# Patient Record
Sex: MALE | Race: BLACK OR AFRICAN AMERICAN | Employment: UNEMPLOYED | URBAN - METROPOLITAN AREA
[De-identification: names, ages, dates, MRNs, and addresses within clinical notes are randomized per-mention and may not be internally consistent; named-entity substitution may affect disease eponyms.]

---

## 2017-05-22 ENCOUNTER — APPOINTMENT (OUTPATIENT)
Dept: GENERAL RADIOLOGY | Age: 67
DRG: 881 | End: 2017-05-22
Attending: EMERGENCY MEDICINE
Payer: MEDICARE

## 2017-05-22 ENCOUNTER — HOSPITAL ENCOUNTER (INPATIENT)
Age: 67
LOS: 3 days | Discharge: HOME | DRG: 881 | End: 2017-05-25
Attending: EMERGENCY MEDICINE | Admitting: PSYCHIATRY & NEUROLOGY
Payer: MEDICARE

## 2017-05-22 DIAGNOSIS — G89.29 CHRONIC ABDOMINAL PAIN: ICD-10-CM

## 2017-05-22 DIAGNOSIS — F10.29 ALCOHOL DEPENDENCE WITH UNSPECIFIED ALCOHOL-INDUCED DISORDER (HCC): Primary | ICD-10-CM

## 2017-05-22 DIAGNOSIS — R10.9 CHRONIC ABDOMINAL PAIN: ICD-10-CM

## 2017-05-22 DIAGNOSIS — F32.A DEPRESSION, UNSPECIFIED DEPRESSION TYPE: ICD-10-CM

## 2017-05-22 LAB
ALBUMIN SERPL BCP-MCNC: 3.5 G/DL (ref 3.4–5)
ALBUMIN/GLOB SERPL: 0.8 {RATIO} (ref 0.8–1.7)
ALP SERPL-CCNC: 120 U/L (ref 45–117)
ALT SERPL-CCNC: 58 U/L (ref 16–61)
AMPHET UR QL SCN: NEGATIVE
ANION GAP BLD CALC-SCNC: 8 MMOL/L (ref 3–18)
APPEARANCE UR: CLEAR
AST SERPL W P-5'-P-CCNC: 58 U/L (ref 15–37)
BARBITURATES UR QL SCN: NEGATIVE
BASOPHILS # BLD AUTO: 0 K/UL (ref 0–0.06)
BASOPHILS # BLD: 0 % (ref 0–2)
BENZODIAZ UR QL: NEGATIVE
BILIRUB SERPL-MCNC: 0.5 MG/DL (ref 0.2–1)
BILIRUB UR QL: NEGATIVE
BUN SERPL-MCNC: 8 MG/DL (ref 7–18)
BUN/CREAT SERPL: 9 (ref 12–20)
CALCIUM SERPL-MCNC: 8.7 MG/DL (ref 8.5–10.1)
CANNABINOIDS UR QL SCN: NEGATIVE
CHLORIDE SERPL-SCNC: 105 MMOL/L (ref 100–108)
CO2 SERPL-SCNC: 28 MMOL/L (ref 21–32)
COCAINE UR QL SCN: NEGATIVE
COLOR UR: YELLOW
CREAT SERPL-MCNC: 0.88 MG/DL (ref 0.6–1.3)
DIFFERENTIAL METHOD BLD: ABNORMAL
EOSINOPHIL # BLD: 0 K/UL (ref 0–0.4)
EOSINOPHIL NFR BLD: 0 % (ref 0–5)
ERYTHROCYTE [DISTWIDTH] IN BLOOD BY AUTOMATED COUNT: 16 % (ref 11.6–14.5)
ETHANOL SERPL-MCNC: 6 MG/DL (ref 0–3)
GLOBULIN SER CALC-MCNC: 4.4 G/DL (ref 2–4)
GLUCOSE SERPL-MCNC: 102 MG/DL (ref 74–99)
GLUCOSE UR STRIP.AUTO-MCNC: NEGATIVE MG/DL
HCT VFR BLD AUTO: 36.2 % (ref 36–48)
HDSCOM,HDSCOM: NORMAL
HGB BLD-MCNC: 12 G/DL (ref 13–16)
HGB UR QL STRIP: NEGATIVE
KETONES UR QL STRIP.AUTO: NEGATIVE MG/DL
LEUKOCYTE ESTERASE UR QL STRIP.AUTO: NEGATIVE
LIPASE SERPL-CCNC: 306 U/L (ref 73–393)
LYMPHOCYTES # BLD AUTO: 25 % (ref 21–52)
LYMPHOCYTES # BLD: 1.2 K/UL (ref 0.9–3.6)
MAGNESIUM SERPL-MCNC: 1.6 MG/DL (ref 1.6–2.6)
MCH RBC QN AUTO: 26.3 PG (ref 24–34)
MCHC RBC AUTO-ENTMCNC: 33.1 G/DL (ref 31–37)
MCV RBC AUTO: 79.4 FL (ref 74–97)
METHADONE UR QL: NEGATIVE
MONOCYTES # BLD: 0.6 K/UL (ref 0.05–1.2)
MONOCYTES NFR BLD AUTO: 12 % (ref 3–10)
NEUTS SEG # BLD: 3.1 K/UL (ref 1.8–8)
NEUTS SEG NFR BLD AUTO: 63 % (ref 40–73)
NITRITE UR QL STRIP.AUTO: NEGATIVE
OPIATES UR QL: NEGATIVE
PCP UR QL: NEGATIVE
PH UR STRIP: 5.5 [PH] (ref 5–8)
PLATELET # BLD AUTO: 197 K/UL (ref 135–420)
PMV BLD AUTO: 9.6 FL (ref 9.2–11.8)
POTASSIUM SERPL-SCNC: 3.3 MMOL/L (ref 3.5–5.5)
PROT SERPL-MCNC: 7.9 G/DL (ref 6.4–8.2)
PROT UR STRIP-MCNC: NEGATIVE MG/DL
RBC # BLD AUTO: 4.56 M/UL (ref 4.7–5.5)
SODIUM SERPL-SCNC: 141 MMOL/L (ref 136–145)
SP GR UR REFRACTOMETRY: 1.02 (ref 1–1.03)
UROBILINOGEN UR QL STRIP.AUTO: 1 EU/DL (ref 0.2–1)
WBC # BLD AUTO: 4.9 K/UL (ref 4.6–13.2)

## 2017-05-22 PROCEDURE — 80307 DRUG TEST PRSMV CHEM ANLYZR: CPT | Performed by: EMERGENCY MEDICINE

## 2017-05-22 PROCEDURE — 74020 XR ABD (AP AND ERECT OR DECUB): CPT

## 2017-05-22 PROCEDURE — 80053 COMPREHEN METABOLIC PANEL: CPT | Performed by: EMERGENCY MEDICINE

## 2017-05-22 PROCEDURE — 83690 ASSAY OF LIPASE: CPT | Performed by: EMERGENCY MEDICINE

## 2017-05-22 PROCEDURE — 74011250637 HC RX REV CODE- 250/637: Performed by: PSYCHIATRY & NEUROLOGY

## 2017-05-22 PROCEDURE — 85025 COMPLETE CBC W/AUTO DIFF WBC: CPT | Performed by: EMERGENCY MEDICINE

## 2017-05-22 PROCEDURE — 65220000005 HC RM SEMIPRIVATE PSYCH 3 OR 4 BED

## 2017-05-22 PROCEDURE — 99284 EMERGENCY DEPT VISIT MOD MDM: CPT

## 2017-05-22 PROCEDURE — 74011250637 HC RX REV CODE- 250/637: Performed by: EMERGENCY MEDICINE

## 2017-05-22 PROCEDURE — 81003 URINALYSIS AUTO W/O SCOPE: CPT | Performed by: EMERGENCY MEDICINE

## 2017-05-22 PROCEDURE — 83735 ASSAY OF MAGNESIUM: CPT | Performed by: EMERGENCY MEDICINE

## 2017-05-22 RX ORDER — SODIUM CHLORIDE 0.9 % (FLUSH) 0.9 %
5-10 SYRINGE (ML) INJECTION AS NEEDED
Status: DISCONTINUED | OUTPATIENT
Start: 2017-05-22 | End: 2017-05-22

## 2017-05-22 RX ORDER — LORAZEPAM 2 MG/ML
1 INJECTION INTRAMUSCULAR
Status: DISCONTINUED | OUTPATIENT
Start: 2017-05-22 | End: 2017-05-22

## 2017-05-22 RX ORDER — IBUPROFEN 600 MG/1
600 TABLET ORAL
Status: DISCONTINUED | OUTPATIENT
Start: 2017-05-22 | End: 2017-05-25 | Stop reason: HOSPADM

## 2017-05-22 RX ORDER — LORAZEPAM 1 MG/1
2 TABLET ORAL
Status: DISCONTINUED | OUTPATIENT
Start: 2017-05-22 | End: 2017-05-22

## 2017-05-22 RX ORDER — CLONIDINE HYDROCHLORIDE 0.1 MG/1
0.1 TABLET ORAL 2 TIMES DAILY
Status: DISCONTINUED | OUTPATIENT
Start: 2017-05-22 | End: 2017-05-25 | Stop reason: HOSPADM

## 2017-05-22 RX ORDER — THERA TABS 400 MCG
1 TAB ORAL DAILY
Status: DISCONTINUED | OUTPATIENT
Start: 2017-05-23 | End: 2017-05-22

## 2017-05-22 RX ORDER — CLONIDINE HYDROCHLORIDE 0.1 MG/1
0.1 TABLET ORAL EVERY 8 HOURS
Status: DISCONTINUED | OUTPATIENT
Start: 2017-05-22 | End: 2017-05-22

## 2017-05-22 RX ORDER — TRAZODONE HYDROCHLORIDE 50 MG/1
50 TABLET ORAL
Status: DISCONTINUED | OUTPATIENT
Start: 2017-05-22 | End: 2017-05-25 | Stop reason: HOSPADM

## 2017-05-22 RX ORDER — DICYCLOMINE HYDROCHLORIDE 10 MG/ML
20 INJECTION INTRAMUSCULAR ONCE
Status: DISCONTINUED | OUTPATIENT
Start: 2017-05-22 | End: 2017-05-22

## 2017-05-22 RX ORDER — LORAZEPAM 2 MG/ML
1-2 INJECTION INTRAMUSCULAR
Status: DISCONTINUED | OUTPATIENT
Start: 2017-05-22 | End: 2017-05-25 | Stop reason: HOSPADM

## 2017-05-22 RX ORDER — HALOPERIDOL 5 MG/1
5 TABLET ORAL
Status: DISCONTINUED | OUTPATIENT
Start: 2017-05-22 | End: 2017-05-25 | Stop reason: HOSPADM

## 2017-05-22 RX ORDER — FOLIC ACID 1 MG/1
1 TABLET ORAL DAILY
Status: DISCONTINUED | OUTPATIENT
Start: 2017-05-23 | End: 2017-05-25 | Stop reason: HOSPADM

## 2017-05-22 RX ORDER — LORAZEPAM 1 MG/1
1 TABLET ORAL
Status: DISCONTINUED | OUTPATIENT
Start: 2017-05-22 | End: 2017-05-22

## 2017-05-22 RX ORDER — LORAZEPAM 1 MG/1
1-2 TABLET ORAL
Status: DISCONTINUED | OUTPATIENT
Start: 2017-05-22 | End: 2017-05-25 | Stop reason: HOSPADM

## 2017-05-22 RX ORDER — LANOLIN ALCOHOL/MO/W.PET/CERES
100 CREAM (GRAM) TOPICAL DAILY
Status: DISCONTINUED | OUTPATIENT
Start: 2017-05-23 | End: 2017-05-22

## 2017-05-22 RX ORDER — ONDANSETRON 4 MG/1
4 TABLET, FILM COATED ORAL
Status: DISCONTINUED | OUTPATIENT
Start: 2017-05-22 | End: 2017-05-25 | Stop reason: HOSPADM

## 2017-05-22 RX ORDER — CHLORDIAZEPOXIDE HYDROCHLORIDE 25 MG/1
25 CAPSULE, GELATIN COATED ORAL
Status: DISCONTINUED | OUTPATIENT
Start: 2017-05-22 | End: 2017-05-25 | Stop reason: HOSPADM

## 2017-05-22 RX ORDER — FOLIC ACID 1 MG/1
1 TABLET ORAL DAILY
Status: DISCONTINUED | OUTPATIENT
Start: 2017-05-23 | End: 2017-05-22

## 2017-05-22 RX ORDER — ATENOLOL 25 MG/1
12.5 TABLET ORAL DAILY
Status: DISCONTINUED | OUTPATIENT
Start: 2017-05-23 | End: 2017-05-22

## 2017-05-22 RX ORDER — LORAZEPAM 2 MG/ML
2 INJECTION INTRAMUSCULAR
Status: DISCONTINUED | OUTPATIENT
Start: 2017-05-22 | End: 2017-05-22

## 2017-05-22 RX ORDER — LANOLIN ALCOHOL/MO/W.PET/CERES
100 CREAM (GRAM) TOPICAL DAILY
Status: DISCONTINUED | OUTPATIENT
Start: 2017-05-23 | End: 2017-05-25 | Stop reason: HOSPADM

## 2017-05-22 RX ORDER — LORAZEPAM 2 MG/ML
3 INJECTION INTRAMUSCULAR
Status: DISCONTINUED | OUTPATIENT
Start: 2017-05-22 | End: 2017-05-22

## 2017-05-22 RX ORDER — HALOPERIDOL 5 MG/ML
5 INJECTION INTRAMUSCULAR
Status: DISCONTINUED | OUTPATIENT
Start: 2017-05-22 | End: 2017-05-25 | Stop reason: HOSPADM

## 2017-05-22 RX ORDER — SODIUM CHLORIDE 0.9 % (FLUSH) 0.9 %
5-10 SYRINGE (ML) INJECTION EVERY 8 HOURS
Status: DISCONTINUED | OUTPATIENT
Start: 2017-05-22 | End: 2017-05-22

## 2017-05-22 RX ORDER — THERA TABS 400 MCG
1 TAB ORAL DAILY
Status: DISCONTINUED | OUTPATIENT
Start: 2017-05-23 | End: 2017-05-25 | Stop reason: HOSPADM

## 2017-05-22 RX ADMIN — CLONIDINE HYDROCHLORIDE 0.1 MG: 0.1 TABLET ORAL at 18:33

## 2017-05-22 RX ADMIN — TRAZODONE HYDROCHLORIDE 50 MG: 50 TABLET ORAL at 22:55

## 2017-05-22 RX ADMIN — CLONIDINE HYDROCHLORIDE 0.1 MG: 0.1 TABLET ORAL at 22:24

## 2017-05-22 NOTE — IP AVS SNAPSHOT
Vahid Irvin 
 
 
 [facility] [facility] Patient: Curt Schaffer MRN: [id number] :1950 You are allergic to the following No active allergies Recent Documentation Height Weight BMI Smoking Status 1.702 m 77.6 kg 26.78 kg/m2 Never Smoker Emergency Contacts Name Discharge Info Relation Home Work Mobile Sherin Davis DISCHARGE CAREGIVER [3] Spouse [3] [phone] About your hospitalization You were admitted on:  May 22, 2017 You last received care in the:  [facility] ADULT CHEM DEP You were discharged on:  May 25, 2017 Unit phone number:  [phone] Why you were hospitalized Your primary diagnosis was:  Not on File Your diagnoses also included:  Depression Providers Seen During Your Hospitalizations Provider Role Specialty Primary office phone Homa Oneal MD Attending Provider Emergency Medicine [phone] Essence Pugh MD Attending Provider Emergency Medicine [phone] Romain Shell MD Attending Provider Psychiatry [phone] Your Primary Care Physician (PCP) Primary Care Physician Office Phone Office Fax Nic Toussaint [phone] Follow-up Information Follow up With Details Comments Contact Info Sundeep Mcguire MD   [address] [place] [address] [phone] 
[phone] [facility] On 2017 3:00pm appointment with Benita Soto LCSW, MAT for continuation of therapy. [address] [address] 
[phone] [address] [facility]. Schedule an appointment as soon as possible for a visit [facility] will be contacting patient regarding appointment for medication management. [address] [facility] [address] 
[phone] Current Discharge Medication List  
  
START taking these medications Dose & Instructions Dispensing Information Comments Morning Noon Evening Bedtime  
 gabapentin 300 mg capsule Commonly known as:  NEURONTIN Dose:  300 mg Take 1 Cap by mouth three (3) times daily for 30 days. Indications: NEUROPATHIC PAIN Quantity:  90 Cap Refills:  0  
     
  
 2:00pm  
   
   
  
  
 traZODone 50 mg tablet Commonly known as:  Arneta Messenger Dose:  50 mg Take 1 Tab by mouth nightly for 30 days. Indications: insomnia associated with depression Quantity:  30 Tab Refills:  0 STOP taking these medications   
 erythromycin ophthalmic ointment Commonly known as:  ILOTYCIN Where to Get Your Medications Information on where to get these meds will be given to you by the nurse or doctor. ! Ask your nurse or doctor about these medications  
  gabapentin 300 mg capsule  
 traZODone 50 mg tablet Discharge Instructions ***IMPORTANT NUMBERS*** [address] [facility] 
      [phone] [address] [place] [address] 
     [phone] Suicide Prevention [phone] Patient is alert x3 and ambulatory. Patient has copy of discharge papers with follow up appt. Patient has prescriptions to be filled at pharmacy of choice. Patient has all personal belongings and has signed form. Patient denies thoughts of self harm or harm to others at this time. Patient armband taken and shredded. Patient received transportation from sister and will be staying with sister until bed is ready at [facility]. Discharge Orders None [facility] Announcement We are excited to announce that we are making your provider's discharge notes available to you in "[employer]. You will see these notes when they are completed and signed by the physician that discharged you from your recent hospital stay.   If you have any questions or concerns about any information you see in Scutum, please call the Health Information Department where you were seen or reach out to your Primary Care Provider for more information about your plan of care. Introducing [facility]! [facility] introduces Scutum patient portal. Now you can access parts of your medical record, email your doctor's office, and request medication refills online. 1. In your internet browser, go to [email or web address] 2. Click on the First Time User? Click Here link in the Sign In box. You will see the New Member Sign Up page. 3. Enter your Scutum Access Code exactly as it appears below. You will not need to use this code after youve completed the sign-up process. If you do not sign up before the expiration date, you must request a new code. · Scutum Access Code: [email or web address] Expires: 8/21/2017  9:17 AM 
 
4. Enter the last four digits of your Social Security Number (xxxx) and Date of Birth (mm/dd/yyyy) as indicated and click Submit. You will be taken to the next sign-up page. 5. Create a Scutum ID. This will be your Scutum login ID and cannot be changed, so think of one that is secure and easy to remember. 6. Create a Scutum password. You can change your password at any time. 7. Enter your Password Reset Question and Answer. This can be used at a later time if you forget your password. 8. Enter your e-mail address. You will receive e-mail notification when new information is available in [address] 9. Click Sign Up. You can now view and download portions of your medical record. 10. Click the Download Summary menu link to download a portable copy of your medical information. If you have questions, please visit the Frequently Asked Questions section of the Scutum website. Remember, Scutum is NOT to be used for urgent needs. For medical emergencies, dial 911. Now available from your iPhone and Android! General Information Please provide this summary of care documentation to your next provider. Patient Signature:  ____________________________________________________________ Date:  ____________________________________________________________  
  
Lajuanda Yashira Provider Signature:  ____________________________________________________________ Date:  ____________________________________________________________

## 2017-05-22 NOTE — ED NOTES
Patient is resting well he currently denies the need for any medication for abdominal pain. Patient is alert and oriented X 4. Call bell within reach. CIWA scale evaluated MD notified.
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
## 2017-05-22 NOTE — ED TRIAGE NOTES
Pt states he just finished his round of chemo December 2016 for colon cancer. Pt c/o n/v & abd pain with rectal bleeding. Pt states he gets \"shaky\" if he stops drinking. Last drink was this morning 40 oz beer. Pt states he also has neuropathy with loss of sensation to finger tips & feet.

## 2017-05-22 NOTE — IP AVS SNAPSHOT
Current Discharge Medication List  
  
START taking these medications Dose & Instructions Dispensing Information Comments Morning Noon Evening Bedtime  
 gabapentin 300 mg capsule Commonly known as:  NEURONTIN Dose:  300 mg Take 1 Cap by mouth three (3) times daily for 30 days. Indications: NEUROPATHIC PAIN Quantity:  90 Cap Refills:  0  
     
  
 2:00pm  
   
   
  
  
 traZODone 50 mg tablet Commonly known as:  Alexia Salt Dose:  50 mg Take 1 Tab by mouth nightly for 30 days. Indications: insomnia associated with depression Quantity:  30 Tab Refills:  0 STOP taking these medications   
 erythromycin ophthalmic ointment Commonly known as:  ILOTYCIN Where to Get Your Medications Information on where to get these meds will be given to you by the nurse or doctor. ! Ask your nurse or doctor about these medications  
  gabapentin 300 mg capsule  
 traZODone 50 mg tablet

## 2017-05-22 NOTE — BSMART NOTE
COMPREHENSIVE ASSESSMENT FORM PART 1     SECTION I - DISPOSITION     The on-call Psychiatrist consulted was Dr. Ricki Howe. The admitting Psychiatrist will be Dr. Ricki Howe. The admitting diagnosis is  Alcohol withdrawal and depression. Patient will be assigned to Dr. Ricki Howe. The Payor source is [place] Medicare/[place] Medicare Part A per the face sheet. Patient was initially discussed with Dr. Monica Siddiqi when patient was is in Bed 13 of the Emergency Room (ER). Patient to be transferred from the ER to [address] [place] [address] Room #998-95 on the Adult/Chemical Dependency Unit. Unit phone is ext. [phone]. SECTION II - INTEGRATED SUMMARY    SOURCE OF INFORMATION:  Patient. REASON FOR CONSULT:  Patient is a 73year old male who presents to the Emergency Room for alcohol withdrawal and depression. Patient states he was diagnosed with Stage 3 colon cancer in 2015 and had two surgeries last year (2016) where \"50 centimeters of my large intestine was removed. \"  Patient admits that he knows he is drinking in excess and states, \"I am drinking a lot to self medicate myself. I'm depressed. This has been going on ever since I received my diagnosis about the cancer back in 2015. I don't do drugs and I don't smoke. My last drink was this morning. I had one 40 ounce bottle of beer. \"      CURRENT LIVING SITUATION:   Homeless. Patient was asked to leave his residence by the landlord because of his excessive drinking. He states that he cannot return to the home until he can prove that he is in treatment for his alcoholism. SI / SELF HARM / HI:  He denies suicidal thoughts, thoughts to self harm and thoughts to harm others. HALLUCINATIONS (AUDITORY/VISUAL/TACTILE/OLFACTORY):  Patient states, \"My ears are constantly ringing. It sounds like a bee hive in my head. \"    FAMILY HX OF MENTAL ILLNESS/SUBSTANCE ABUSE:  Mother was an alcoholic. PRIOR TREATMENT HX:  INPATIENT:   None.    OUTPATIENT:   Patient was seeing a therapist at the onset of his cancer diagnosis. He was utilizing an [employer] (EAP) and was allowed 3-4 visits. He has not been seeing a therapist since his last visit in 2015. DRUG/ALCOHOL/SMOKING HISTORY (last used/daily usage amount):  Patient denies a history of drug use or smoking. He admits to drinking beer, two 40 ounce beers (Forrest 45) daily. SLEEP HABITS:  \"Infrequent. Four hours max. \"    APPETITE/FOOD INTAKE:  \"My appetite is not good. I don't eat sweets any more. I can't eat them. I don't like the smell of sofia or the smell of any food that is being cooked. \"    MEDICAL HISTORY:   Current medical concerns:    Patient states his only medical issue was his cancer.  PCP/Therapist/Psychiatrist:  juan pablo Surgeon:  Dr. Mary almeida Oncologist:  Dr. Akira Ibarra Primary Care Physician:  Dr. Emilee Fields Current medications/Medication History:  Patient denies being on any medications.     Asha Soares, RN, BSN

## 2017-05-23 LAB
ANION GAP BLD CALC-SCNC: 7 MMOL/L (ref 3–18)
APPEARANCE UR: CLEAR
BACTERIA URNS QL MICRO: ABNORMAL /HPF
BILIRUB UR QL: NEGATIVE
BUN SERPL-MCNC: 12 MG/DL (ref 7–18)
BUN/CREAT SERPL: 13 (ref 12–20)
CALCIUM SERPL-MCNC: 8.7 MG/DL (ref 8.5–10.1)
CHLORIDE SERPL-SCNC: 104 MMOL/L (ref 100–108)
CO2 SERPL-SCNC: 28 MMOL/L (ref 21–32)
COLOR UR: YELLOW
CREAT SERPL-MCNC: 0.95 MG/DL (ref 0.6–1.3)
EPITH CASTS URNS QL MICRO: ABNORMAL /LPF (ref 0–5)
GLUCOSE SERPL-MCNC: 111 MG/DL (ref 74–99)
GLUCOSE UR STRIP.AUTO-MCNC: NEGATIVE MG/DL
HGB UR QL STRIP: NEGATIVE
KETONES UR QL STRIP.AUTO: ABNORMAL MG/DL
LEUKOCYTE ESTERASE UR QL STRIP.AUTO: NEGATIVE
MUCOUS THREADS URNS QL MICRO: ABNORMAL /LPF
NITRITE UR QL STRIP.AUTO: NEGATIVE
PH UR STRIP: 5.5 [PH] (ref 5–8)
POTASSIUM SERPL-SCNC: 3.7 MMOL/L (ref 3.5–5.5)
PROT UR STRIP-MCNC: NEGATIVE MG/DL
RBC #/AREA URNS HPF: NEGATIVE /HPF (ref 0–5)
SODIUM SERPL-SCNC: 139 MMOL/L (ref 136–145)
SP GR UR REFRACTOMETRY: 1.02 (ref 1–1.03)
UROBILINOGEN UR QL STRIP.AUTO: 1 EU/DL (ref 0.2–1)
WBC URNS QL MICRO: ABNORMAL /HPF (ref 0–4)

## 2017-05-23 PROCEDURE — 74011250637 HC RX REV CODE- 250/637: Performed by: PSYCHIATRY & NEUROLOGY

## 2017-05-23 PROCEDURE — 74011250637 HC RX REV CODE- 250/637: Performed by: EMERGENCY MEDICINE

## 2017-05-23 PROCEDURE — 81001 URINALYSIS AUTO W/SCOPE: CPT | Performed by: PSYCHIATRY & NEUROLOGY

## 2017-05-23 PROCEDURE — 80048 BASIC METABOLIC PNL TOTAL CA: CPT | Performed by: PSYCHIATRY & NEUROLOGY

## 2017-05-23 PROCEDURE — 36415 COLL VENOUS BLD VENIPUNCTURE: CPT | Performed by: PSYCHIATRY & NEUROLOGY

## 2017-05-23 PROCEDURE — 65220000005 HC RM SEMIPRIVATE PSYCH 3 OR 4 BED

## 2017-05-23 RX ORDER — GABAPENTIN 300 MG/1
300 CAPSULE ORAL 3 TIMES DAILY
Status: DISCONTINUED | OUTPATIENT
Start: 2017-05-23 | End: 2017-05-25 | Stop reason: HOSPADM

## 2017-05-23 RX ADMIN — Medication 100 MG: at 08:35

## 2017-05-23 RX ADMIN — TRAZODONE HYDROCHLORIDE 50 MG: 50 TABLET ORAL at 20:47

## 2017-05-23 RX ADMIN — GABAPENTIN 300 MG: 300 CAPSULE ORAL at 20:48

## 2017-05-23 RX ADMIN — CLONIDINE HYDROCHLORIDE 0.1 MG: 0.1 TABLET ORAL at 08:34

## 2017-05-23 RX ADMIN — THERA TABS 1 TABLET: TAB at 08:35

## 2017-05-23 RX ADMIN — FOLIC ACID 1 MG: 1 TABLET ORAL at 08:35

## 2017-05-23 RX ADMIN — CLONIDINE HYDROCHLORIDE 0.1 MG: 0.1 TABLET ORAL at 20:48

## 2017-05-23 NOTE — ED NOTES
TRANSFER - OUT REPORT:    Verbal report given to [facility] RN(name) on Maikel Young  being transferred to [facility](unit) for routine progression of care       Report consisted of patients Situation, Background, Assessment and   Recommendations(SBAR). Information from the following report(s) ED Summary was reviewed with the receiving nurse. Lines:   Peripheral IV 05/22/17 Right Wrist (Active)   Site Assessment Clean, dry, & intact 5/22/2017  2:12 PM   Phlebitis Assessment 0 5/22/2017  2:12 PM   Infiltration Assessment 0 5/22/2017  2:12 PM   Dressing Status Clean, dry, & intact 5/22/2017  2:12 PM   Dressing Type Transparent 5/22/2017  2:12 PM   Hub Color/Line Status Pink;Flushed;Patent 5/22/2017  2:12 PM   Action Taken Blood drawn 5/22/2017  2:12 PM        Opportunity for questions and clarification was provided.       Patient transported with:   Franny Krause

## 2017-05-23 NOTE — BH NOTES
Patient ate breakfast. Patient took morning medications and attend group. Patient is getting adjusted to the unit. Patient interacted with other patients. Patient took shower and had clothes washed. Patient involved in no falls this shift, Skid proof footwear utilized. Patient is safe on the unit.

## 2017-05-23 NOTE — ED NOTES
Patient has been given dinner, BP rechecked at this time call bell within reach.  Patient currently watching TV

## 2017-05-23 NOTE — PROGRESS NOTES
Problem: Depressed Mood (Adult/Pediatric)  Goal: *STG: Attends activities and groups  Patient to attend 2-3 group therapy every day while hospitalized. Outcome: Progressing Towards Goal  Patient participated in group    Problem: Alcohol Withdrawal  Goal: *STG: Remains safe in hospital  Patient to remain safe every shift every day while hospitalized. Outcome: Progressing Towards Goal  Patient contracted to safety  Goal: *STG: Complies with medication therapy  Patient to take medications as prescribed every shift every day while hospitalized. Outcome: Progressing Towards Goal  Patient received medications as prescribed    Problem: Falls - Risk of  Goal: *Absence of falls  Outcome: Progressing Towards Goal  No fall observed, patient utilized non skid footwear for safety    Comments:   Patient cooperative and pleasant, denies thoughts to harm self or others. Patient stated that he is glad to be here for treatment and that he volunteered to come into this place. Patient stated that he knows that he is an alcoholic and has been trying to stop but it seems like it is taking him a long time due to one incident in life or the other. Patient was encouraged to attend AA meetings while still here and also continue to attend AA meetings in the community after discharge. Some addresses was provided by this writer to patient. Patient participated in group, ate 100% dinner, received medications as prescribed and also utilized non skid footwear for safety.  Will continue to monitor

## 2017-05-23 NOTE — ED NOTES
Patient present for assistance with alcohol withdrawal patient c/o abdominal pain currently.  Call bell within reach, no additional wants or needs noted at this  Time,
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
## 2017-05-23 NOTE — BSMART NOTE
OCCUPATIONAL THERAPY PROGRESS NOTE  Group Time:  2554  Attendance: The patient attended full group. Participation:  The patient refused to participate in the activity. Attention:  The patient was able to focus on the activity. Interaction:  The patient occasionally  interacts with others. Angry even before start of group asking if group is mandatory (this before recorder had even initiated group. Answers superficial and sarcastic, response to [facility] are you feeling today\" during introductions was \"I have Neuropathies in my hands and can't feel\" and recounted how he has just recovered from cancer. Then asked this recorder if group was mandatory and reminded groups are mandatory, but you don't have to attend and patient given that choice. Continues to be in group with occasional mostly to self comments. At end of group, very angry and accusatory and could/would not hear any explanations given. Yelling at recorder, denied he was angry most of group. Unclear what issue was as he would share little with group except his drinking. May have been related to detox as he was angry before group started.

## 2017-05-23 NOTE — BSMART NOTE
ART THERAPY GROUP PROGRESS NOTE    PATIENT SCHEDULED FOR GROUP AT: 14:00    ATTENDANCE: Full    PARTICIPATION LEVEL: Participates fully in the art process    ATTENTION LEVEL: Able to focus on task    FOCUS: Problem-solving    SYMBOLIC & THEMATIC CONTENT AS NOTED IN IMAGERY: He was calm, compliant, and polite. He was invested in the task at hand and actively participated in group discussion. 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 His speech and associations had an intellectualized quality and he offered encouragement to group members.

## 2017-05-24 PROCEDURE — 65220000005 HC RM SEMIPRIVATE PSYCH 3 OR 4 BED

## 2017-05-24 PROCEDURE — 74011250637 HC RX REV CODE- 250/637: Performed by: EMERGENCY MEDICINE

## 2017-05-24 PROCEDURE — 74011250637 HC RX REV CODE- 250/637: Performed by: PSYCHIATRY & NEUROLOGY

## 2017-05-24 RX ADMIN — Medication 100 MG: at 08:48

## 2017-05-24 RX ADMIN — GABAPENTIN 300 MG: 300 CAPSULE ORAL at 08:48

## 2017-05-24 RX ADMIN — GABAPENTIN 300 MG: 300 CAPSULE ORAL at 21:03

## 2017-05-24 RX ADMIN — FOLIC ACID 1 MG: 1 TABLET ORAL at 08:48

## 2017-05-24 RX ADMIN — CLONIDINE HYDROCHLORIDE 0.1 MG: 0.1 TABLET ORAL at 21:03

## 2017-05-24 RX ADMIN — GABAPENTIN 300 MG: 300 CAPSULE ORAL at 15:24

## 2017-05-24 RX ADMIN — THERA TABS 1 TABLET: TAB at 08:48

## 2017-05-24 RX ADMIN — CLONIDINE HYDROCHLORIDE 0.1 MG: 0.1 TABLET ORAL at 08:48
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
## 2017-05-24 NOTE — PROGRESS NOTES
conducted an initial consultation and Spiritual Assessment for Gisele Lyles, who is a 73 y. o.,male. Patients Primary Language is: [place]. According to the patients EMR Roman Catholic Affiliation is: [place]. The reason the Patient came to the hospital is:   Patient Active Problem List    Diagnosis Date Noted    Depression 05/22/2017        The  provided the following Interventions:  Initiated a relationship of care and support. Explored issues of jh, belief, spirituality and Sabianist/ritual needs while hospitalized. Listened empathically. Provided chaplaincy education. Provided information about Spiritual Care Services. Offered prayer and assurance of continued prayers on patient's behalf. Chart reviewed. The following outcomes where achieved:  Patient shared limited information about both their medical narrative and spiritual journey/beliefs. Patient processed feeling about current hospitalization. Patient expressed gratitude for 's visit. Assessment:  Patient does not have any Sabianist/cultural needs that will affect patients preferences in health care. There are no spiritual or Sabianist issues which require intervention at this time. Plan:  Chaplains will continue to follow and will provide pastoral care on an as needed/requested basis.  recommends bedside caregivers page  on duty if patient shows signs of acute spiritual or emotional distress.       [address] [facility]   [phone]

## 2017-05-24 NOTE — BH NOTES
Milagro Huerta is  participating in [place]. Group time: 15 minutes    Personal goal for participation: Community announcement    Goal orientation: community    Group therapy participation: fully participated    Therapeutic interventions reviewed and discussed: Staff discussed  Staff discussed the Mental Health programs offered. Unit schedule for groups,  Visiting hours, patient advocate name and phone number and where this information is posted on the unit. , etc,,. Report any maintenance/housekeeping or treatment concerns to staff so it can be addressed. Impression of participation: Pt.did not have any maintenance/housekeeping or treatment concerns to report to staff .

## 2017-05-24 NOTE — PROGRESS NOTES
Problem: Depressed Mood (Adult/Pediatric)  Goal: *STG: Attends activities and groups  Patient to attend 2-3 group therapy every day while hospitalized. Outcome: Progressing Towards Goal  Patient attended and participated in groups. Problem: Alcohol Withdrawal  Goal: *STG: Remains safe in hospital  Patient to remain safe every shift every day while hospitalized. Outcome: Progressing Towards Goal  Patient contracts for safety in the hospital.  Goal: *STG: Complies with medication therapy  Patient to take medications as prescribed every shift every day while hospitalized. Outcome: Progressing Towards Goal  Patient takes mediation per MD orders. Comments:   Patient has been up at will on unit. He is alert and oriented to time, place and situation. He denies thoughts of harming self or other. appetite is good. No complaints of physical discomforts.

## 2017-05-24 NOTE — BSMART NOTE
OCCUPATIONAL THERAPY PROGRESS NOTE  Group Time:  8226  Attendance: The patient attended full group. Participation:  The patient participated fully in the activity. Attention:  The patient was able to focus on the activity. Interaction:  The patient frequently interacts with others. Mood bright. Mina Olivares

## 2017-05-24 NOTE — BH NOTES
GROUP THERAPY PROGRESS NOTE    Kajal Wolf is participating in Recreational Therapy. Group time: 45 minutes    Personal goal for participation: fresh air break/games on the unit    Goal orientation: social    Group therapy participation: active    Therapeutic interventions reviewed and discussed: Staff encouraged Pt to get off the unit and go outside and get some fresh air, or play games on the unit with peers. Impression of participation:   Pt. chose to stay on the unit and play games with peers and color abrahan patterns.

## 2017-05-24 NOTE — BH NOTES
SANDHYAH.T. Note: -S/O The abov ept has been pleasant upon approach he has been social with staff and peers. He has been participating in all scheduled groups on this shift. He has been visible talking on the phone with family and friends which appeared to have went well during this shift. -A-Problem#1 cont. -P-Maintain a safe and supportive environment.

## 2017-05-24 NOTE — BH NOTES
Malena Herrera is  participating in [place]. Group time: 15 minutes    Personal goal for participation: Community announcement    Goal orientation: community    Group therapy participation: fully participated    Therapeutic interventions reviewed and discussed: Staff discussed  Staff discussed the Mental Health programs offered. Unit schedule for groups,  Visiting hours, patient advocate name and phone number and where this information is posted on the unit. , etc,,. Report any maintenance/housekeeping or treatment concerns to staff so it can be addressed. Impression of participation: Pt.did not have any maintenance/housekeeping or treatment concerns to report to staff .

## 2017-05-24 NOTE — BH NOTES
GROUP THERAPY PROGRESS NOTE    Maikel Young is participating in Occupational Therapy. Group time: 25 minutes    Personal goal for participation: have a good day.      Goal orientation: community    Group therapy participation: active    Therapeutic interventions reviewed and discussed: rules and regulations

## 2017-05-24 NOTE — BH NOTES
Psychiatry progress note    Chart reviewed, patient interviewed. Discussed/reviewed events leading up to hospitalization and in intake assessment. Patient says he was struggling with alcohol use and the consequences of the same after being charged with public intoxication. Patient feels withdrawal has resolved and he will be able to stop drinking on his own. Stated he was fully aware of where he went wrong. Patient is taking Neurontin and feels it is helping chronic pain and that Trazodone is helping him sleep much better. Patient stated he consulted with SW about relationship problems with wife and landlord and is thankful for receiving intervention. On exam, euthymic. No SI, HI or AVH. Insight and judgment fair. Meds - Gabapentin 300 TID, Trazodone 50 qHS PRN. Assessment - Adjustment disorder with depressed mood. Alcohol use disorder, moderate. Stabilizing. Anticipate discharge tomorrow if continues to improve and remains stable.

## 2017-05-24 NOTE — H&P
[address] [facility] ADMIT NOTE-P    Name:  Endy Caban  MR#:  [id number]  :  1950  Account #:  [de-identified]  Date of Adm:  2017      CHIEF COMPLAINT: \"Had to deal with the past 8 to 9 years. \"    HISTORY OF PRESENT ILLNESS: The patient was a 52-year-old  male. He described a number of different issues he was dealing with. The patient stated his mother and niece  in a house fire in . His brother  of pancreatic cancer in . The patient himself  suffered from colon cancer from  and then his younger brother  was murdered in . The patient stated that as of January and  2017, his cancer has been in remission; however, he is  suffering from neuropathy related to the chemotherapy and the cancer. The patient stated he has been self medicating with alcohol. The  patient stated that he had been feeling good up until May 2017, at  which time he was charged with being drunk in public. The patient  stated that recently his wife gave him an ultimatum. He stated that also  his landlord threatened to evict him unless he was treated for  alcoholism. The patient stated his last drink was several days ago. He  stated he has been drinking two 40-ounce beers per day. When he first  came to the hospital the patient says he has some mild shakes, but he  feels these are improving. The patient said that his mood is good. He  says he feels upset and depressed after his arrest. He denied thoughts  of self-harm or harm to others. He denied psychotic symptoms. He is  requesting help getting off alcohol to get through the shakes. He is also  requesting family intervention to talk with his wife about the marital  crisis. FAMILY PSYCHIATRIC HISTORY: Mother, alcoholism. PAST PSYCHIATRIC HISTORY: The patient stated 17 years ago he  had a previous psychiatric hospitalization for drug addiction.     PAST MEDICAL HISTORY: Chronic pain/neuropathy, tinnitus, history  of colon cancer and history of chest hernia, and abdominal hernia and  muscle spasms. MEDICATIONS: None. ALLERGIES: NO KNOWN DRUG-RELATED ALLERGIES. SUBSTANCE ABUSE HISTORY: The patient states he has been  drinking excessively as discussed above. He stated that decades ago  he used illicit drugs. SOCIAL HISTORY: The patient lives in [place], [place] with his  wife; however, says he was evicted a week and half ago due to his  alcohol use. He is on disability. The patient served in the [employer]  for 7 years. He says he does not go to the [facility] because he  does not trust them. MENTAL STATUS EXAMINATION: The patient was euthymic. Speech  was normal. Thought process logical. The patient denied suicidal or  homicidal thoughts. He denied visual or auditory hallucinations. No  tremulousness. Memory and cognition were grossly intact. Insight and  judgment fair. ASSESSMENT: This is a 72-year-old male with adjustment disorder  and alcohol use disorder. The patient is currently in alcohol detox that  is rapidly resolving. DSM-V DIAGNOSES  1. Adjustment disorder with depressed mood. 2. Alcohol use disorder, moderate. TREATMENT PLAN  1. Alcohol detox with p.r.n. Librium and vitamin supplementation. 2. Start gabapentin 300 three times a day for chronic pain/neuropathy. 3. Observation of suspected medical condition. 4. Crisis intervention.   5. Discharge planning    ESTIMATED LENGTH OF STAY: 3-4 days        MD FLAQUITA Hill / JESSE  D:  05/23/2017   20:22  T:  05/23/2017   21:23  Job #:  [id number]

## 2017-05-24 NOTE — BSMART NOTE
ART THERAPY GROUP PROGRESS NOTE    PATIENT SCHEDULED FOR GROUP AT: 14:00    ATTENDANCE: Full    PARTICIPATION LEVEL: Participates fully in the art process    ATTENTION LEVEL: Needs occasional re-direction    FOCUS: Grounding/ relaxation    SYMBOLIC & THEMATIC CONTENT AS NOTED IN IMAGERY: he was talkative and attention-seeking with group members. His comments were sarcastic and minimizing. He joked about alcohol consumption, in which he was reminded where he was and why that was inappropriate to joke about in a mental health facility with peers struggling with substance abuse. He was responsive to re-direction, however remained avoidant.

## 2017-05-25 VITALS
TEMPERATURE: 97.6 F | SYSTOLIC BLOOD PRESSURE: 95 MMHG | RESPIRATION RATE: 16 BRPM | DIASTOLIC BLOOD PRESSURE: 67 MMHG | OXYGEN SATURATION: 100 % | HEIGHT: 67 IN | WEIGHT: 171 LBS | HEART RATE: 74 BPM | BODY MASS INDEX: 26.84 KG/M2

## 2017-05-25 PROCEDURE — 74011250637 HC RX REV CODE- 250/637: Performed by: PSYCHIATRY & NEUROLOGY

## 2017-05-25 PROCEDURE — 74011250637 HC RX REV CODE- 250/637: Performed by: EMERGENCY MEDICINE

## 2017-05-25 RX ORDER — GABAPENTIN 300 MG/1
300 CAPSULE ORAL 3 TIMES DAILY
Qty: 90 CAP | Refills: 0 | Status: SHIPPED | OUTPATIENT
Start: 2017-05-25 | End: 2017-06-24

## 2017-05-25 RX ORDER — TRAZODONE HYDROCHLORIDE 50 MG/1
50 TABLET ORAL
Qty: 30 TAB | Refills: 0 | Status: SHIPPED | OUTPATIENT
Start: 2017-05-25 | End: 2017-06-24

## 2017-05-25 RX ADMIN — CLONIDINE HYDROCHLORIDE 0.1 MG: 0.1 TABLET ORAL at 08:40

## 2017-05-25 RX ADMIN — THERA TABS 1 TABLET: TAB at 08:40

## 2017-05-25 RX ADMIN — Medication 100 MG: at 08:40

## 2017-05-25 RX ADMIN — GABAPENTIN 300 MG: 300 CAPSULE ORAL at 08:40

## 2017-05-25 RX ADMIN — GABAPENTIN 300 MG: 300 CAPSULE ORAL at 14:04

## 2017-05-25 RX ADMIN — FOLIC ACID 1 MG: 1 TABLET ORAL at 08:40

## 2017-05-25 NOTE — BH NOTES
GROUP THERAPY PROGRESS NOTE    Jacinto Salgado is participating in Leisure-Creative Group. Group time: 1.5 hour    Personal goal for participation: Relaxation, Social    Goal orientation: social    Group therapy participation: active    Therapeutic interventions reviewed and discussed: Peers chose to watch a movie together and socialize among each other. Impression of participation: Pt fully engaged in watching the movie. Pt is socializing with peers; appears to be enjoying himself.

## 2017-05-25 NOTE — DISCHARGE INSTRUCTIONS
***IMPORTANT NUMBERS***        [address]        [phone]    [facility]       [phone]    Suicide Prevention     [phone]          Patient is alert x3 and ambulatory. Patient has copy of discharge papers with follow up appt. Patient has prescriptions to be filled at pharmacy of choice. Patient has all personal belongings and has signed form. Patient denies thoughts of self harm or harm to others at this time. Patient armband taken and shredded. Patient received transportation from sister and will be staying with sister until bed is ready at [facility].

## 2017-05-25 NOTE — BH NOTES
Patient was informed of phone call from [facility] to supervisor regarding bed placement. [facility] informed that patient has been accepted but bed will not be available until tomorrow. Patient asked nurse if \"they\" left a number so he could return the call. Patient reminded that it was the supervisor that was called, not this nurse. Nurse called supervisor to inquire about [facility] leaving a number for patient to return the call. No number was left for patient. Patient voiced irritation over not having bed available until tomorrow and voiced \"well, I'm leaving TOnight whether it's ready or not. \"   walked onto unit at this time and asked patient for 1:1 at this time. Will continue to monitor.

## 2017-05-25 NOTE — BH NOTES
GROUP THERAPY PROGRESS NOTE    Curt Schaffer is participating in Goals Group and Community. Group time: 15 minutes    Personal goal for participation: discussed honesty,participation in treatment,and goals for discharge. Goal orientation: personal    Group therapy participation: active    Therapeutic interventions reviewed and discussed:     Impression of participation: states feeling better but has job and recognizes need to stop drinking   alcohol.

## 2017-05-25 NOTE — BH NOTES
Gisele Lyles is  participating in [place]. Group time: 15 minutes    Personal goal for participation: Community announcement    Goal orientation: community    Group therapy participation: fully participated    Therapeutic interventions reviewed and discussed: Staff discussed  Staff discussed the Mental Health programs offered. Unit schedule for groups,  Visiting hours, patient advocate name and phone number and where this information is posted on the unit. , etc,,. Report any maintenance/housekeeping or treatment concerns to staff so it can be addressed. Impression of participation: Pt.did not have any maintenance/housekeeping or treatment concerns to report to staff .

## 2017-05-25 NOTE — BH NOTES
GROUP THERAPY PROGRESS NOTE    Dannie Campuzano is participating in [address] Group    Group time: 1hour  Personal goal for participation:  Seek information on Alcohol      Goal orientation: active  Group therapy participation:   Fully participated    Therapeutic interventions reviewed and discussed: Staff encouraged Pt. To participate in Group    Impression of participation:  [address] members reviewed a film, then held an open discussion with Pt.  Pt. Gayle Sandoval and received feedback while in group

## 2017-05-25 NOTE — BSMART NOTE
GROUP THERAPY PROGRESS NOTE    Arun Light is participating in Recreational Therapy.      Group time: 45 minutes    Personal goal for participation:  Fresh air and relaxation    Goal orientation: social    Group therapy participation: active    Therapeutic interventions reviewed and discussed: Patient was encouraged by staff    Impression of participation: calm

## 2017-05-25 NOTE — PROGRESS NOTES
Problem: Depressed Mood (Adult/Pediatric)  Goal: *STG: Participates in treatment plan  Patient to participate in treatment plan every day while hospitalized. Outcome: Progressing Towards Goal  Pt attending and participating in scheduled groups  Goal: *STG: Attends activities and groups  Patient to attend 2-3 group therapy every day while hospitalized. Outcome: Progressing Towards Goal  Pt attending and participating in scheduled groups    Comments:   Pt active in milieu interacting well with peers and staff. Pt pleasant and cooperative. Pt denies SI. Pt VS stable and is not demonstrating withdrawal symptoms. Pt remains on safety precautions with rounds conducted Q 15 mins.  Staff will continue to offer a safe and supportive environment

## 2017-05-25 NOTE — BSMART NOTE
SW COLLATERAL: The patient has a therapy appointment at [address] [place] [address] with Saranya Ramirez on 6/9/17 at 3:15 pm. The agency will call back with an approval for an appointment with Dr. Mookie Santos for medication. The address and contact number is [address] [place] [address], [address] [place] [address];  Fax: [phone]

## 2017-05-25 NOTE — BSMART NOTE
ART THERAPY GROUP PROGRESS NOTE    Group time:11:15    The patient did not awaken/get up when called for group. He sat out and observed the group the last five minutes from the day area.

## 2017-05-25 NOTE — BH NOTES
Patient is alert x3 and ambulatory. Patient has copy of discharge paperwork with follow up appointment. Patient has prescriptions to be filled at pharmacy of choice. Patient has received valuables from security and has all personal items and has signed form. Patient denies thoughts of self harm or harm to others at this time. Patient's sister is picking patient up at time of discharge and patient will be staying with sister until bed at [facility] is available. Patient denies pain or other medical complaints at this time.

## 2017-05-25 NOTE — BSMART NOTE
OCCUPATIONAL THERAPY PROGRESS NOTE  Group Time:  9913  Attendance: The patient attended full group. Participation:  The patient participated with moderate elaboration in the activity. Attention:  The patient needed redirection to activity at least once. Interaction:  The patient acknowledges others or responds to questions,  with no spontaneous interaction. Somewhat preoccupied, likely about not being able to return home. Stated he had a residence in [place] he planned to go to and it would be closer to the [facility] as well. Seemed settled with this decision.

## 2017-05-25 NOTE — BSMART NOTE
SW SESSION: The SW met with the patient to assess progress. Initially the patient presented in a optimistic mood/affrect; however, when the SW informed him that the housing development where his wife resides is not willing to permit him to come back he became tearful and sad. The SW discussed the benefits of having an optimistic attitude and discussed coping skills; how to utilize his strengths/community resources. The SW contacted [facility] to have him assessed for their housing and MH/SA treatment programs. The patient signed a release so that information could be relayed.

## 2017-05-26 NOTE — DISCHARGE SUMMARY
[address] [place] SUMMARY    Name:  Isidro Ruiz  MR#:  [id number]  :  1950  Account #:  [de-identified]  Date of Adm:  2017  Date of Discharge:  2017      HOSPITAL COURSE: The patient was hospitalized at [facility] from May 22nd to the  for approximately  3 days. Upon admission, the patient described feeling depressed and  upset surrounding a housing dispute related to his alcohol use. The  patient also stated that he had been given an ultimatum from his wife  to stop drinking. The patient was started on the alcohol detox protocol  with Librium, and he was started on gabapentin 300 three times a day  for chronic pain and neuropathy. Over the course of his stay, the  patient's symptoms were rapidly improved. He had minimal withdrawal  symptoms, and his mood and affect were bright and pleasant. The  patient received benefit from Neurontin, which he felt helped with his  chronic neuropathy, and the trazodone helped him sleep. The patient  spoke with his wife and felt supported. The patient stated that he was  looking at going to the [facility] program in [place]; however, he stated  that he wanted to stay in [place] because it would affect his  employment. The patient planned to stay with his godmother after  discharge. The patient denied suicidal or homicidal thoughts  throughout his stay, and he felt his mood was good. The patient stated  he learned new insights into why he was drinking and felt in control  of his alcohol use. He was motivated for abstinence. DSMV DISCHARGE DIAGNOSES  1. Adjustment disorder with depressed mood. 2. Alcohol use disorder, moderate. DISCHARGE MEDICATIONS  1. Gabapentin 300 three times a day. 2. Trazodone 50 every evening. CONDITION ON DISCHARGE: Stable. PROGNOSIS: Good. DISCHARGE PLAN: The patient will follow up with the care plan  coordinated by Care Management.         MD FLAQUITA Javier / FS  D:  05/25/2017   18:36  T:  05/26/2017   02:46  Job #:  [id number]

## 2018-01-01 ENCOUNTER — HOME CARE VISIT (OUTPATIENT)
Dept: SCHEDULING | Facility: HOME HEALTH | Age: 68
End: 2018-01-01
Payer: MEDICARE

## 2018-01-01 ENCOUNTER — HOME CARE VISIT (OUTPATIENT)
Dept: HOSPICE | Facility: HOSPICE | Age: 68
End: 2018-01-01
Payer: MEDICARE

## 2018-01-01 ENCOUNTER — HOSPITAL ENCOUNTER (EMERGENCY)
Age: 68
Discharge: HOSPICE | End: 2018-06-20
Attending: EMERGENCY MEDICINE
Payer: MEDICARE

## 2018-01-01 ENCOUNTER — HOSPITAL ENCOUNTER (EMERGENCY)
Age: 68
Discharge: HOME | End: 2018-05-24
Attending: EMERGENCY MEDICINE
Payer: MEDICARE

## 2018-01-01 ENCOUNTER — HOSPICE ADMISSION (OUTPATIENT)
Dept: HOSPICE | Facility: HOSPICE | Age: 68
End: 2018-01-01
Payer: MEDICARE

## 2018-01-01 VITALS
RESPIRATION RATE: 18 BRPM | DIASTOLIC BLOOD PRESSURE: 64 MMHG | TEMPERATURE: 97.3 F | HEART RATE: 94 BPM | SYSTOLIC BLOOD PRESSURE: 100 MMHG | OXYGEN SATURATION: 92 %

## 2018-01-01 VITALS
TEMPERATURE: 97.7 F | OXYGEN SATURATION: 93 % | RESPIRATION RATE: 16 BRPM | HEART RATE: 64 BPM | SYSTOLIC BLOOD PRESSURE: 98 MMHG | DIASTOLIC BLOOD PRESSURE: 80 MMHG

## 2018-01-01 VITALS
OXYGEN SATURATION: 93 % | TEMPERATURE: 97.8 F | DIASTOLIC BLOOD PRESSURE: 64 MMHG | SYSTOLIC BLOOD PRESSURE: 98 MMHG | RESPIRATION RATE: 18 BRPM | HEART RATE: 84 BPM

## 2018-01-01 VITALS
WEIGHT: 143 LBS | HEIGHT: 66 IN | SYSTOLIC BLOOD PRESSURE: 121 MMHG | TEMPERATURE: 97.3 F | RESPIRATION RATE: 19 BRPM | HEART RATE: 94 BPM | BODY MASS INDEX: 22.98 KG/M2 | DIASTOLIC BLOOD PRESSURE: 84 MMHG | OXYGEN SATURATION: 100 %

## 2018-01-01 VITALS
OXYGEN SATURATION: 99 % | SYSTOLIC BLOOD PRESSURE: 94 MMHG | HEART RATE: 91 BPM | DIASTOLIC BLOOD PRESSURE: 66 MMHG | TEMPERATURE: 98.5 F | RESPIRATION RATE: 14 BRPM

## 2018-01-01 VITALS
SYSTOLIC BLOOD PRESSURE: 104 MMHG | HEART RATE: 86 BPM | TEMPERATURE: 98 F | DIASTOLIC BLOOD PRESSURE: 80 MMHG | OXYGEN SATURATION: 95 % | RESPIRATION RATE: 20 BRPM

## 2018-01-01 VITALS
WEIGHT: 143 LBS | RESPIRATION RATE: 20 BRPM | TEMPERATURE: 97 F | OXYGEN SATURATION: 98 % | DIASTOLIC BLOOD PRESSURE: 80 MMHG | HEIGHT: 66 IN | HEART RATE: 90 BPM | BODY MASS INDEX: 22.98 KG/M2 | SYSTOLIC BLOOD PRESSURE: 120 MMHG

## 2018-01-01 VITALS
RESPIRATION RATE: 16 BRPM | DIASTOLIC BLOOD PRESSURE: 80 MMHG | OXYGEN SATURATION: 99 % | TEMPERATURE: 95.9 F | SYSTOLIC BLOOD PRESSURE: 110 MMHG | HEART RATE: 96 BPM

## 2018-01-01 VITALS
DIASTOLIC BLOOD PRESSURE: 77 MMHG | TEMPERATURE: 97.2 F | HEART RATE: 96 BPM | SYSTOLIC BLOOD PRESSURE: 101 MMHG | WEIGHT: 143 LBS | BODY MASS INDEX: 23.08 KG/M2 | RESPIRATION RATE: 13 BRPM | OXYGEN SATURATION: 98 %

## 2018-01-01 VITALS
RESPIRATION RATE: 20 BRPM | SYSTOLIC BLOOD PRESSURE: 122 MMHG | HEART RATE: 96 BPM | TEMPERATURE: 97.6 F | OXYGEN SATURATION: 99 % | DIASTOLIC BLOOD PRESSURE: 80 MMHG

## 2018-01-01 VITALS
HEART RATE: 95 BPM | DIASTOLIC BLOOD PRESSURE: 60 MMHG | SYSTOLIC BLOOD PRESSURE: 100 MMHG | TEMPERATURE: 97.5 F | OXYGEN SATURATION: 98 %

## 2018-01-01 DIAGNOSIS — Z51.5 END OF LIFE CARE: ICD-10-CM

## 2018-01-01 DIAGNOSIS — C79.9 METASTATIC CANCER (HCC): Primary | ICD-10-CM

## 2018-01-01 DIAGNOSIS — G89.3 CANCER ASSOCIATED PAIN: ICD-10-CM

## 2018-01-01 DIAGNOSIS — E86.0 DEHYDRATION: Primary | ICD-10-CM

## 2018-01-01 DIAGNOSIS — E87.1 HYPONATREMIA: ICD-10-CM

## 2018-01-01 DIAGNOSIS — C25.9 MALIGNANT NEOPLASM OF PANCREAS, UNSPECIFIED LOCATION OF MALIGNANCY (HCC): ICD-10-CM

## 2018-01-01 LAB
ALBUMIN SERPL-MCNC: 2.9 G/DL (ref 3.4–5)
ALBUMIN/GLOB SERPL: 0.4 {RATIO} (ref 0.8–1.7)
ALP SERPL-CCNC: 444 U/L (ref 45–117)
ALT SERPL-CCNC: 26 U/L (ref 16–61)
ANION GAP SERPL CALC-SCNC: 9 MMOL/L (ref 3–18)
AST SERPL-CCNC: 33 U/L (ref 15–37)
BASOPHILS # BLD: 0 K/UL (ref 0–0.1)
BASOPHILS NFR BLD: 0 % (ref 0–2)
BILIRUB SERPL-MCNC: 0.5 MG/DL (ref 0.2–1)
BUN SERPL-MCNC: 33 MG/DL (ref 7–18)
BUN/CREAT SERPL: 33 (ref 12–20)
CALCIUM SERPL-MCNC: 8.8 MG/DL (ref 8.5–10.1)
CHLORIDE SERPL-SCNC: 91 MMOL/L (ref 100–108)
CO2 SERPL-SCNC: 24 MMOL/L (ref 21–32)
CREAT SERPL-MCNC: 0.99 MG/DL (ref 0.6–1.3)
DIFFERENTIAL METHOD BLD: ABNORMAL
EOSINOPHIL # BLD: 0 K/UL (ref 0–0.4)
EOSINOPHIL NFR BLD: 0 % (ref 0–5)
ERYTHROCYTE [DISTWIDTH] IN BLOOD BY AUTOMATED COUNT: 13.9 % (ref 11.6–14.5)
GLOBULIN SER CALC-MCNC: 6.5 G/DL (ref 2–4)
GLUCOSE SERPL-MCNC: 164 MG/DL (ref 74–99)
HCT VFR BLD AUTO: 36.5 % (ref 36–48)
HGB BLD-MCNC: 12.8 G/DL (ref 13–16)
LYMPHOCYTES # BLD: 1.5 K/UL (ref 0.9–3.6)
LYMPHOCYTES NFR BLD: 14 % (ref 21–52)
MCH RBC QN AUTO: 25.3 PG (ref 24–34)
MCHC RBC AUTO-ENTMCNC: 35.1 G/DL (ref 31–37)
MCV RBC AUTO: 72.3 FL (ref 74–97)
MONOCYTES # BLD: 1.2 K/UL (ref 0.05–1.2)
MONOCYTES NFR BLD: 11 % (ref 3–10)
NEUTS SEG # BLD: 8.2 K/UL (ref 1.8–8)
NEUTS SEG NFR BLD: 75 % (ref 40–73)
PLATELET # BLD AUTO: 403 K/UL (ref 135–420)
PMV BLD AUTO: 8.9 FL (ref 9.2–11.8)
POTASSIUM SERPL-SCNC: 4.8 MMOL/L (ref 3.5–5.5)
PROT SERPL-MCNC: 9.4 G/DL (ref 6.4–8.2)
RBC # BLD AUTO: 5.05 M/UL (ref 4.7–5.5)
SODIUM SERPL-SCNC: 124 MMOL/L (ref 136–145)
WBC # BLD AUTO: 10.9 K/UL (ref 4.6–13.2)

## 2018-01-01 PROCEDURE — 3336590001 HSPC ROOM AND BOARD

## 2018-01-01 PROCEDURE — A9270 NON-COVERED ITEM OR SERVICE: HCPCS

## 2018-01-01 PROCEDURE — 0651 HSPC ROUTINE HOME CARE

## 2018-01-01 PROCEDURE — T4541 LARGE DISPOSABLE UNDERPAD: HCPCS

## 2018-01-01 PROCEDURE — G0299 HHS/HOSPICE OF RN EA 15 MIN: HCPCS

## 2018-01-01 PROCEDURE — 3336500001 HSPC ELECTION

## 2018-01-01 PROCEDURE — 96374 THER/PROPH/DIAG INJ IV PUSH: CPT

## 2018-01-01 PROCEDURE — PC101 HC HSPC R&B RUG RATE

## 2018-01-01 PROCEDURE — A4223 INFUSION SUPPLIES W/O PUMP: HCPCS

## 2018-01-01 PROCEDURE — HHS10554 SHAMPOO/BODY WASH 8 OZ ALOE VESTA

## 2018-01-01 PROCEDURE — 80053 COMPREHEN METABOLIC PANEL: CPT | Performed by: EMERGENCY MEDICINE

## 2018-01-01 PROCEDURE — 99285 EMERGENCY DEPT VISIT HI MDM: CPT

## 2018-01-01 PROCEDURE — G0155 HHCP-SVS OF CSW,EA 15 MIN: HCPCS

## 2018-01-01 PROCEDURE — A6402 STERILE GAUZE <= 16 SQ IN: HCPCS

## 2018-01-01 PROCEDURE — HOSPICE MEDICATION HC HH HOSPICE MEDICATION

## 2018-01-01 PROCEDURE — 3330220001 HC HSPC R&B RUG RATE

## 2018-01-01 PROCEDURE — T4526 ADULT SIZE PULL-ON MED: HCPCS

## 2018-01-01 PROCEDURE — 96361 HYDRATE IV INFUSION ADD-ON: CPT

## 2018-01-01 PROCEDURE — G0156 HHCP-SVS OF AIDE,EA 15 MIN: HCPCS

## 2018-01-01 PROCEDURE — 74011250636 HC RX REV CODE- 250/636

## 2018-01-01 PROCEDURE — A6250 SKIN SEAL PROTECT MOISTURIZR: HCPCS

## 2018-01-01 PROCEDURE — A6258 TRANSPARENT FILM >16<=48 IN: HCPCS

## 2018-01-01 PROCEDURE — 85025 COMPLETE CBC W/AUTO DIFF WBC: CPT | Performed by: EMERGENCY MEDICINE

## 2018-01-01 PROCEDURE — A4927 NON-STERILE GLOVES: HCPCS

## 2018-01-01 PROCEDURE — G0151 HHCP-SERV OF PT,EA 15 MIN: HCPCS

## 2018-01-01 PROCEDURE — 74011250636 HC RX REV CODE- 250/636: Performed by: EMERGENCY MEDICINE

## 2018-01-01 RX ORDER — MORPHINE SULFATE 4 MG/ML
4 INJECTION INTRAVENOUS
Status: COMPLETED | OUTPATIENT
Start: 2018-01-01 | End: 2018-01-01

## 2018-01-01 RX ORDER — MORPHINE SULFATE 4 MG/ML
INJECTION INTRAVENOUS
Status: COMPLETED
Start: 2018-01-01 | End: 2018-01-01

## 2018-01-01 RX ORDER — MORPHINE SULFATE 4 MG/ML
8 INJECTION INTRAVENOUS
Status: COMPLETED | OUTPATIENT
Start: 2018-01-01 | End: 2018-01-01

## 2018-01-01 RX ADMIN — MORPHINE SULFATE 4 MG: 4 INJECTION INTRAVENOUS at 10:08

## 2018-01-01 RX ADMIN — SODIUM CHLORIDE 1000 ML: 900 INJECTION, SOLUTION INTRAVENOUS at 10:20

## 2018-01-01 RX ADMIN — MORPHINE SULFATE 8 MG: 4 INJECTION INTRAVENOUS at 10:09

## 2018-05-24 NOTE — ED TRIAGE NOTES
To room 17. Arrived via EMS. Stated pancreatic cancer pain (stage 4) has been worse last two days. Pain in chest and back. Has two drains.

## 2018-05-24 NOTE — HOSPICE
Hospice referral is appreciated. Chart review in progress. 1140 Met with patient and his wife at patient's bedside. Pt presented to the ED for abdominal pain. He stated that he was diagnosed with colon cancer approximately 3 years ago which has metastasized to his liver and pancreas. His last chemo treatment was 3 weeks ago. He stated that he knows that he is too weak to have anymore chemo and is at peace with starting hospice care. Pt would like to use  hospice. He is awaiting a response from [address] [place] [address] for acceptance. They prefer this facility because it is close to home. Pt has a mediport right chest wall and a biliary drainage system RLQ of abdomen which will require flushes  Every 8 hours. He has been taking oxycodone and morphine for pain at home. Provided them with BS hospice contact information.

## 2018-05-24 NOTE — PROGRESS NOTES
Dr. Chandra Leslie called me to ED to see pt. Met with pt and spouse Sachin Bucio at bedside. Pt states he wants hospice and [facility] [address] [facility] [address]. Pt's nurse Juan Bernal informed of pt's wishes. [address] and spoke with Charmayne Angst. Charmayne Angst will see pt. Called Cathy of [facility] and she will see if she has male bed available and will call me back. 1035:  Called ED to inform Dr. Chandra Leslie. 1240:  Called Cathy of [facility] and she states no male bed in available today but she will have a male bed available tomorrow and she will start working on [place] today. Informed Dr. Chandra Leslie.     Pt has been uploaded via [employer] to ACP for review

## 2018-05-24 NOTE — ED PROVIDER NOTES
EMERGENCY DEPARTMENT HISTORY AND PHYSICAL EXAM    9:18 AM      Date: 5/24/2018  Patient Name: Nickolas Gupta    History of Presenting Illness     Chief Complaint   Patient presents with    Chest Pain    Back Pain         History Provided By: Patient      Chief Complaint: Back pain  Duration:  Several months  Timing:  constant  Location: lower  Quality: N/a  Severity: 10/10  Modifying Factors: Nothing  Associated Symptoms: abdominal pain, chest pain, and nausea      Additional History (Context): Nickolas Gupta is a 78 y.o. male with HTN and depression who presents with constant 10/10 lower back pain for several months. Pt reports that he has been diagnosed with pancreatic cancer and the pain in his back, abdomen, and chest is a result of the cancer. He wants to be placed on hospice and is tired of receiving chemotherapy treatments. The pt is currently nauseous and denies vomiting. He has 2 drains ports in his abdomen. No other concerns, modifying factors, or symptoms were expressed by the pt at this time. PCP: Darien Nazario MD        Past History     Past Medical History:  Past Medical History:   Diagnosis Date    Hypertension     Psychiatric disorder     \"chronic depression\"       Past Surgical History:  Past Surgical History:   Procedure Laterality Date    ABDOMEN SURGERY PROC UNLISTED  abdominal hernian repair    HX COLONOSCOPY  04/13       Family History:  No family history on file. Social History:  Social History   Substance Use Topics    Smoking status: Never Smoker    Smokeless tobacco: None    Alcohol use Yes      Comment: occational beer       Allergies:  No Known Allergies      Review of Systems     Review of Systems   Constitutional: Positive for activity change, appetite change, fatigue and unexpected weight change. Negative for chills, diaphoresis and fever.    HENT: Negative for congestion, dental problem, drooling, ear discharge, ear pain, facial swelling, hearing loss, mouth sores, nosebleeds, postnasal drip, rhinorrhea, sinus pressure, sneezing, sore throat, tinnitus and trouble swallowing. Eyes: Negative for photophobia, pain, discharge, redness, itching and visual disturbance. Respiratory: Negative for apnea, cough, choking, chest tightness, shortness of breath, wheezing and stridor. Cardiovascular: Positive for chest pain. Negative for palpitations and leg swelling. Gastrointestinal: Positive for abdominal pain and nausea. Negative for abdominal distention, anal bleeding, blood in stool, constipation, diarrhea, rectal pain and vomiting. Endocrine: Negative for cold intolerance, heat intolerance, polydipsia, polyphagia and polyuria. Genitourinary: Negative for decreased urine volume, difficulty urinating, dysuria, enuresis, flank pain, frequency, genital sores, hematuria and urgency. Musculoskeletal: Positive for back pain. Negative for arthralgias, gait problem, joint swelling, myalgias, neck pain and neck stiffness. Skin: Negative for color change, pallor, rash and wound. Allergic/Immunologic: Negative for environmental allergies, food allergies and immunocompromised state. Neurological: Negative for dizziness, tremors, seizures, syncope, facial asymmetry, speech difficulty, weakness, light-headedness, numbness and headaches. Hematological: Negative for adenopathy. Does not bruise/bleed easily. Psychiatric/Behavioral: Negative for agitation, behavioral problems, confusion, decreased concentration, dysphoric mood, hallucinations, self-injury, sleep disturbance and suicidal ideas. The patient is not nervous/anxious and is not hyperactive. Physical Exam     Visit Vitals    /84    Pulse 94    Temp 97.3 °F (36.3 °C)    Resp 19    Ht 5' 6\" (1.676 m)    Wt 64.9 kg (143 lb)    SpO2 100%    BMI 23.08 kg/m2     Physical Exam   Constitutional: He is oriented to person, place, and time. He appears well-developed and well-nourished.    Thin male alert and appropriate in apparent moderate discomfort without signs of acute respiratory distress   HENT:   Head: Normocephalic and atraumatic. Right Ear: External ear normal.   Left Ear: External ear normal.   Mouth/Throat: Oropharynx is clear and moist. No oropharyngeal exudate. Eyes: Conjunctivae and EOM are normal. Pupils are equal, round, and reactive to light. Right eye exhibits no discharge. Left eye exhibits no discharge. No scleral icterus. Neck: Normal range of motion. Neck supple. No JVD present. No tracheal deviation present. No thyromegaly present. Cardiovascular: Regular rhythm, normal heart sounds and intact distal pulses. Exam reveals no gallop and no friction rub. No murmur heard. Tachycardia but regular   Pulmonary/Chest: Effort normal. No stridor. No respiratory distress. He has no wheezes. He has no rales. He exhibits no tenderness. Breath sounds distant but symmetrical   Abdominal: Soft. Bowel sounds are normal. He exhibits no distension and no mass. There is tenderness. There is no rebound and no guarding. Mild diffuse tenderness, hypoactive bowel sounds; brown turbid fluid draining from both abdominal drains   Musculoskeletal: Normal range of motion. He exhibits no edema or tenderness. Lymphadenopathy:     He has no cervical adenopathy. Neurological: He is alert and oriented to person, place, and time. No cranial nerve deficit. Coordination normal.   Skin: Skin is warm. No rash noted. No erythema. Psychiatric: He has a normal mood and affect. His behavior is normal. Judgment and thought content normal.   Nursing note and vitals reviewed.         Diagnostic Study Results     Labs -  Recent Results (from the past 12 hour(s))   CBC WITH AUTOMATED DIFF    Collection Time: 05/24/18 10:10 AM   Result Value Ref Range    WBC 10.9 4.6 - 13.2 K/uL    RBC 5.05 4.70 - 5.50 M/uL    HGB 12.8 (L) 13.0 - 16.0 g/dL    HCT 36.5 36.0 - 48.0 %    MCV 72.3 (L) 74.0 - 97.0 FL    MCH 25.3 24.0 - 34.0 PG    MCHC 35.1 31.0 - 37.0 g/dL    RDW 13.9 11.6 - 14.5 %    PLATELET 520 055 - 107 K/uL    MPV 8.9 (L) 9.2 - 11.8 FL    NEUTROPHILS 75 (H) 40 - 73 %    LYMPHOCYTES 14 (L) 21 - 52 %    MONOCYTES 11 (H) 3 - 10 %    EOSINOPHILS 0 0 - 5 %    BASOPHILS 0 0 - 2 %    ABS. NEUTROPHILS 8.2 (H) 1.8 - 8.0 K/UL    ABS. LYMPHOCYTES 1.5 0.9 - 3.6 K/UL    ABS. MONOCYTES 1.2 0.05 - 1.2 K/UL    ABS. EOSINOPHILS 0.0 0.0 - 0.4 K/UL    ABS. BASOPHILS 0.0 0.0 - 0.1 K/UL    DF AUTOMATED     METABOLIC PANEL, COMPREHENSIVE    Collection Time: 05/24/18 10:10 AM   Result Value Ref Range    Sodium 124 (L) 136 - 145 mmol/L    Potassium 4.8 3.5 - 5.5 mmol/L    Chloride 91 (L) 100 - 108 mmol/L    CO2 24 21 - 32 mmol/L    Anion gap 9 3.0 - 18 mmol/L    Glucose 164 (H) 74 - 99 mg/dL    BUN 33 (H) 7.0 - 18 MG/DL    Creatinine 0.99 0.6 - 1.3 MG/DL    BUN/Creatinine ratio 33 (H) 12 - 20      GFR est AA >60 >60 ml/min/1.73m2    GFR est non-AA >60 >60 ml/min/1.73m2    Calcium 8.8 8.5 - 10.1 MG/DL    Bilirubin, total 0.5 0.2 - 1.0 MG/DL    ALT (SGPT) 26 16 - 61 U/L    AST (SGOT) 33 15 - 37 U/L    Alk. phosphatase 444 (H) 45 - 117 U/L    Protein, total 9.4 (H) 6.4 - 8.2 g/dL    Albumin 2.9 (L) 3.4 - 5.0 g/dL    Globulin 6.5 (H) 2.0 - 4.0 g/dL    A-G Ratio 0.4 (L) 0.8 - 1.7         Radiologic Studies -   No orders to display     FINDINGS: Air is seen throughout colon. Air is seen in a few nondistended small  bowel loops. There is a calcification in the left pelvis probably representing a  phlebolith. Surgical clips are seen overlying the mid abdomen. There is chronic  blunting of the left costophrenic angle with parenchymal scarring in the left  lung base. There is no free air.     IMPRESSION  Impression:     Nonobstructive bowel gas pattern. Medical Decision Making   I am the first provider for this patient.     I reviewed the vital signs, available nursing notes, past medical history, past surgical history, family history and social history. Vital Signs-Reviewed the patient's vital signs. Pulse Oximetry Analysis -  Nonhypoxic    Cardiac Monitor:  Rate: 102  Rhythm:  Sinus Tachycardia     Records Reviewed: Nursing Notes and Old Medical Records (Time of Review: 9:58 AM)    ED Course: Progress Notes, Reevaluation, and Consults:  Patient felt improved with IV Morphine and IV fluid resuscitation. Case manger contacted [address] [place] [address] for hospice evaluation. Will discharge to home and have follow-up for hospice admission tomorrow. Counseled on increasing dietary sodium intake. Precautions given. Patient feels much improved and is tolerating oral intake at time of discharge. Provider Notes (Medical Decision Making): Patient with progressive pain and weakness associated with metastatic pancreatic cancer. Patient no longer desires chemotherapy and is seeking hospice placement. Will treat pain and dehydration while evaluating for anemia, renal dysfunction, and electrolyte abnormality. No evidence of focal infection and will hold on sepsis work-up unless neutropenic on labs. Pain is not acutely worse and no new neurologic or vascular symptoms requiring further acute imaging at this time. Will contact  to assist with hospice placement. The patient and his family member agree with this plan. Diagnosis     Clinical Impression:   1. Dehydration    2. Cancer associated pain    3. Malignant neoplasm of pancreas, unspecified location of malignancy ([address]    4.  Hyponatremia        Disposition: Discharge    Follow-up Information     None           Patient's Medications    No medications on file     _______________________________    Attestations:  Scribe Attestation     Nikki Josue acting as a scribe for and in the presence of Akil Pelletier MD      May 24, 2018 at 9:18 AM       Provider Attestation:      I personally performed the services described in the documentation, reviewed the documentation, as recorded by the scribe in my presence, and it accurately and completely records my words and actions.  May 24, 2018 at 9:18 AM - Neela Reese MD    _______________________________

## 2018-05-24 NOTE — DISCHARGE INSTRUCTIONS
Continue current medications and follow-up with [facility] for admission tomorrow  Return immediately for increasing pain, fever, dehydration, or any other concerns           Learning About Cancer Pain  What is cancer pain? Cancer pain may be caused by the cancer or by the treatments and tests used. The pain may make it hard for you to do your normal activities, such as sleeping or eating. Over time, cancer pain can cause appetite and sleep problems, isolation, and depression. But most cancer pain can be managed with medicines and other methods. This may not mean that you have no pain but that it stays at a level that you can bear. Treating your pain will make you feel better. You will be more active, eat and sleep better, and enjoy your family and friends. What are some key points about cancer pain? · You are the only person who can say how much pain you have. If you tell your doctor when you have pain or when pain changes, your doctor can help you. · Cancer pain can almost always be relieved if you work with your doctor to create a treatment plan that is right for you. · Pain is often easier to control right after it starts. This may mean it is better to take regular doses instead of waiting until the pain becomes bad. · Take your medicines exactly as prescribed. Call your doctor if you think you are having a problem with your medicine. · You may find that taking your medicine works most of the time, but your pain flares up during extra activity or for no clear reason. This is called breakthrough pain. Ask your doctor what you can do if this happens. Your doctor can give you a prescription for fast-acting medicines that you can take for breakthrough pain. · People who take cancer pain medicines rarely become addicted to them. Your body may come to expect daily doses of medicine to control the pain.  But your doctor can gradually lower the amount you are taking when and if the cause of your pain is gone. What treatments can help you manage cancer pain? Medical treatments to manage cancer pain include:  · Prescription and over-the-counter medicines. Many types of medicines are used. Your doctor may suggest different combinations of medicines. · Surgery, chemotherapy, radiation, and hormone therapy. These may be used to remove or destroy a tumor that is causing pain. · Nerve blocks. These are used to help with nerve pain. A medicine is injected into the nerve that affects the painful area. Nonmedical treatments include:  · Physical therapy, gentle massage, acupuncture, and heat or cold to ease pain. · Stretching, yoga, and exercises to help you keep your strength, flexibility, and mobility. · Relaxation, biofeedback, or meditation to relieve stress and anxiety. · Short-term crisis therapy with a counselor. This may help you manage your cancer pain or the discomfort from cancer treatments. · Education and emotional support. Learning as much as you can about your pain may help. So can sharing your feelings with others. A support group can be a safe and comfortable place to talk about your illness. · Complementary therapies, such as aromatherapy, prayer, and humor therapy, may be helpful. How can you manage cancer pain? Your doctor needs all the information you can give about what your pain feels like. It often helps to write things down in a pain diary. · Write down when your pain starts, what it feels like, and how long it lasts. Use words like dull, aching, sharp, shooting, throbbing, or burning. · Note changes in your pain. Is it constant, or does it come and go? Do you have more than one kind of pain? How long does it last?  · Rate your pain on a scale of 0 to 10, with 0 being no pain and 10 being the worst pain you can imagine. · Write exactly where you feel pain. You can use a drawing. Say whether the pain is just in that one place or several places at once.  Or tell your doctor if it travels from one place to another. · Write down what makes your pain better or worse. Note when you used a treatment, how well it worked, and any side effects. If you and your doctor are not able to control your pain, ask about seeing a pain specialist. A pain specialist is a health professional who focuses on treating resistant pain. Talk to your doctor if you are having problems with depression. Treating depression can make it easier to manage your cancer pain. Where can you learn more? Go to http://irina-lolly.info/. Enter K531 in the search box to learn more about \"Learning About Cancer Pain. \"  Current as of: May 12, 2017  Content Version: 11.4  © 0121-0209 [employer]wise, [place]. Care instructions adapted under license by [employer] (which disclaims liability or warranty for this information). If you have questions about a medical condition or this instruction, always ask your healthcare professional. [address] any warranty or liability for your use of this information.

## 2018-06-20 NOTE — ED TRIAGE NOTES
[facility] for unresponsive episode this morning. Pt became unresponsive while laying in bed. Pt is on hospice for multiple cancers. Only complaint by patient is sore throat.

## 2018-06-20 NOTE — DISCHARGE INSTRUCTIONS
Learning About Hospice and Palliative Care  What are hospice and palliative care? Palliative (say \"PAL-renee-uh-tiv\") care is an area of medicine that helps give you more good days by providing care for quality-of-life issues. It includes treating symptoms like pain, nausea, or sleep problems. It can also include helping you and your loved ones to:  · Understand your illness better. · Talk more openly about your feelings. · Decide what treatments you want or don't want. · Communicate better with your doctors, nurses, and each other. Hospice care is a type of palliative care. But it's for people who are near the end of life. What kinds of care are involved? Palliative care: This treatment helps you feel better physically, emotionally, and spiritually while doctors also treat your illness. Your care may include pain relief, counseling, or nutrition advice. Hospice care: Again, the goal of this type of care is to help you feel better. And it can help you get the most out of the time you have left. But you no longer get treatment to try to cure your illness. When does care happen? Palliative care: This care can happen at any time during a serious illness. You don't have to be near death to get this care. Hospice care: In most cases, you can choose hospice care when your doctor believes that you have no more than about 6 months to live. Where does the care happen? Palliative care: This care often happens in hospitals or long-term care facilities like nursing homes. It can take place wherever you are treated, even in your home. Hospice care: Most hospice care is done in the place the patient calls \"home. \" This is often the person's home. But it could also be a place like a nursing home or nursing home center. Hospice care may also be given in hospice centers, hospitals, and other places. Who provides the care? Palliative care: There are doctors and nurses who specialize in this field.  But your own doctor may also give some of this care. And there are many other experts who may help you. These include social workers, counselors, therapists, and nutrition experts. Hospice care: In hospitals, hospice centers, and other facilities, care is given by doctors, nurses, and others who are trained in hospice care. In the home, a family member is often the main caregiver. But the family member gets help from care experts. They are on call 24 hours a day. Where can you learn more? Go to http://irina-lolly.info/. Enter 466 [id number] in the search box to learn more about \"Learning About Hospice and Palliative Care. \"  Current as of: September 24, 2016  Content Version: 11.4  © 8743-3271 [employer]. Care instructions adapted under license by [employer] (which disclaims liability or warranty for this information). If you have questions about a medical condition or this instruction, always ask your healthcare professional. [address] any warranty or liability for your use of this information.

## 2018-06-20 NOTE — ED NOTES
[facility]. Pt in no acute distress upon leaving ER.  Discharge paperwork sent with [address] [place] [address]

## 2018-06-20 NOTE — ED PROVIDER NOTES
EMERGENCY DEPARTMENT HISTORY AND PHYSICAL EXAM    10:22 AM      Date: 6/20/2018  Patient Name: Raymon Reynoso    History of Presenting Illness     Chief Complaint   Patient presents with    Other     unresponsive episode    Sore Throat         History Provided By: Hospice Staff     Chief Complaint: unresponsive episode  Duration:  Timing:    Location:  Quality:   Severity:   Modifying Factors:   Associated Symptoms:      Additional History (Context): Raymon Reynoso is a 78 y.o. male with PMHx significant for widely metastatic pancreatic cancer who presents from Hospice with CC of unresponsive episode today. He was sent to the ED patient currently does not have a DNR. HPI limited, Pt nonverbal.         PCP: La Nena Daigle MD    Current Outpatient Prescriptions   Medication Sig Dispense Refill    oxyCODONE-acetaminophen (PERCOCET) 5-325 mg per tablet Take 1 Tab by mouth every four (4) hours as needed for Pain.  LORazepam (INTENSOL) 2 mg/mL concentrated solution Take 1 mg by mouth every six (6) hours as needed for Agitation or Anxiety.  morphine (ROXANOL) 100 mg/5 mL (20 mg/mL) concentrated solution Take 10 mg by mouth every three (3) hours as needed for Pain.  polyethylene glycol (MIRALAX) 17 gram packet Take 17 g by mouth daily.  morphine CR (MS CONTIN) 30 mg CR tablet Take 30 mg by mouth every eight (8) hours. morphine sulfate ER      folic acid/multivit-min/lutein (CENTRUM SILVER PO) Take 1 Dose by mouth daily. pt states he takes daily but sometimes every other day      docusate sodium (COLACE) 100 mg capsule Take 100 mg by mouth daily as needed for Constipation.  ondansetron (ZOFRAN ODT) 4 mg disintegrating tablet Take 4 mg by mouth every six (6) hours as needed for Nausea.          Past History     Past Medical History:  Past Medical History:   Diagnosis Date    Hypertension     Psychiatric disorder     \"chronic depression\"       Past Surgical History:  Past Surgical History: Procedure Laterality Date    ABDOMEN SURGERY PROC UNLISTED  abdominal hernian repair    HX COLONOSCOPY  04/13       Family History:  No family history on file. Social History:  Social History   Substance Use Topics    Smoking status: Never Smoker    Smokeless tobacco: Not on file    Alcohol use Yes      Comment: occational beer       Allergies:  No Known Allergies      Review of Systems       Review of Systems   Unable to perform ROS: Patient nonverbal         Physical Exam     Visit Vitals    /67    Pulse 95    Temp 97.2 °F (36.2 °C)    Resp 16    SpO2 98%         Physical Exam   Constitutional: He appears well-developed. He is cooperative. No distress. Cachectic appearing  -american    HENT:   Head: Normocephalic and atraumatic. Mouth/Throat: Oropharynx is clear and moist. No oropharyngeal exudate. Eyes: Conjunctivae and EOM are normal. Right eye exhibits no discharge. Left eye exhibits no discharge. No scleral icterus. Neck: Normal range of motion and phonation normal. Neck supple. No JVD present. No thyromegaly present. Cardiovascular: Normal rate, regular rhythm, S1 normal, S2 normal, normal heart sounds and intact distal pulses. Exam reveals no gallop and no friction rub. No murmur heard. Pulmonary/Chest: Effort normal and breath sounds normal. No accessory muscle usage. No respiratory distress. He has no wheezes. He has no rhonchi. He has no rales. Port in right chest    Abdominal: Soft. Normal appearance and bowel sounds are normal. He exhibits no distension and no pulsatile midline mass. There is no tenderness. There is no rebound and no guarding. Drain in right abdomen   Musculoskeletal: Normal range of motion. He exhibits no edema or deformity. Lymphadenopathy:        Head (right side): No submandibular adenopathy present. He has no cervical adenopathy. Neurological:   Nonverbal  Minimally responsive   Skin: Skin is warm and dry. No rash noted. Psychiatric: He has a normal mood and affect. His speech is normal and behavior is normal.   Nursing note and vitals reviewed. Diagnostic Study Results     Labs -  No results found for this or any previous visit (from the past 12 hour(s)). Radiologic Studies -   No results found. Medical Decision Making   I am the first provider for this patient. I reviewed the vital signs, available nursing notes, past medical history, past surgical history, family history and social history. Vital Signs-Reviewed the patient's vital signs. Records Reviewed: Nursing Notes and Old Medical Records (Time of Review: 10:22 AM)      Provider Notes (Medical Decision Making):   ASSESSMENT / PLAN:   66y/o AAM, complex PMhx with widely metastatic pancreatic cancer, in [address] [place] [address] at [facility], end of life/comfort care who was sent to the ED for increasing pain and less responsiveness. They sent him here per policy because he is DNR/DNI officially al[place]outh he has expressed to his  and others that he is ready to die and doesnt want to suffer any more. On initial presentation, he was moaning and obviously uncomfortable in bed. I gave him 4mg IV morphine and he became more calm and rested comfortably. He is minimaly verbal at baseline and just occasionally whispers but is unclear and obviously confused. Cant give any history. Vitals stable. I spoke with his Hospice coordinator and a Hospice nurse at bedside. We all believe he is in the dying process and that he is confused and cant really make decisions about his DNR/DNI status at this time. We agreed that focusing on comfort measures is best at this time and no labs/imaging should be done. They are going to take him back to the [facility] and continue their excellent care there.  They are going to speak with his next of kin about formal DNR/DNI paperwork but we all agreed that should he code in the mean time, that CPR/defibrillation would be futile and only cause more pain/suffering and therefore would likely not be initiated. -Will d/c back to Hospice center  -They will continue care there  -They will speak with next of kin  -Can return to ED at any time if they wish but he will likely pass away in their care soon. Judy Rod MD  - Physician          ED Course: Progress Notes, Reevaluation, and Consults:    10:34 AM  Spoke with both Murphy Derick ([phone]) from Hospice and RN Jaelyn Villeda (present in ED). Agreed to only comfort measures in the ED for this patient with discharge back to hospice for continued care. They have agreed to discuss this comfort measures and patient's DNR status with family and legal representation of patient. Diagnosis     Clinical Impression: No diagnosis found. Disposition: Discharge to Hospice     Follow-up Information     None           Patient's Medications   Start Taking    No medications on file   Continue Taking    DOCUSATE SODIUM (COLACE) 100 MG CAPSULE    Take 100 mg by mouth daily as needed for Constipation. FOLIC ACID/MULTIVIT-MIN/LUTEIN (CENTRUM SILVER PO)    Take 1 Dose by mouth daily. pt states he takes daily but sometimes every other day    LORAZEPAM (INTENSOL) 2 MG/ML CONCENTRATED SOLUTION    Take 1 mg by mouth every six (6) hours as needed for Agitation or Anxiety. MORPHINE (ROXANOL) 100 MG/5 ML (20 MG/ML) CONCENTRATED SOLUTION    Take 10 mg by mouth every three (3) hours as needed for Pain. MORPHINE CR (MS CONTIN) 30 MG CR TABLET    Take 30 mg by mouth every eight (8) hours. morphine sulfate ER    ONDANSETRON (ZOFRAN ODT) 4 MG DISINTEGRATING TABLET    Take 4 mg by mouth every six (6) hours as needed for Nausea. OXYCODONE-ACETAMINOPHEN (PERCOCET) 5-325 MG PER TABLET    Take 1 Tab by mouth every four (4) hours as needed for Pain. POLYETHYLENE GLYCOL (MIRALAX) 17 GRAM PACKET    Take 17 g by mouth daily.    These Medications have changed    No medications on file   Stop Taking    No medications on file     _______________________________    Attestations:  Homar Noe acting as a scribe for and in the presence of Shahriar Schneider MD      June 20, 2018 at 10:22 AM       Provider Attestation:      I personally performed the services described in the documentation, reviewed the documentation, as recorded by the scribe in my presence, and it accurately and completely records my words and actions.  June 20, 2018 at 10:22 AM - Shahriar Schneider MD    _______________________________

## 2021-03-02 NOTE — H&P
History and Physical        Patient: José Miguel Guerra               Sex: male          DOA: 5/22/2017         YOB: 1950      Age:  73 y.o.        LOS:  LOS: 1 day        HPI:     José Miguel Guerra is a 73 y.o. male who was admitted experiencing alcohol dependence, depression and suicidal ideation. Active Problems:    Depression (5/22/2017)        Past Medical History:   Diagnosis Date    Hypertension     Psychiatric disorder     \"chronic depression\"       Past Surgical History:   Procedure Laterality Date    ABDOMEN SURGERY PROC UNLISTED  abdominal hernian repair    HX COLONOSCOPY  04/13       History reviewed. No pertinent family history. Social History     Social History    Marital status:      Spouse name: N/A    Number of children: 1    Years of education: Some college     Social History Main Topics    Smoking status: Never Smoker    Smokeless tobacco: None    Alcohol use Yes      Comment: occational beer    Drug use: No    Sexual activity: Not Asked     Other Topics Concern    None     Social History Narrative   Patient states he lives in housing with others. Looking to get back with his wife. States appetite and sleep have been fair. He receives disability. Prior to Admission medications    Medication Sig Start Date End Date Taking? Authorizing Provider   erythromycin (ILOTYCIN) ophthalmic ointment Apply to affected eye(s) six (6) times a day for 7 days. 2/15/13   Chriss Dakin, PA       No Known Allergies    Review of Systems  A comprehensive review of systems was negative. Physical Exam:      Visit Vitals    /78 (BP 1 Location: Right arm, BP Patient Position: Sitting)    Pulse 79    Temp 97.5 °F (36.4 °C)    Resp 17    Ht 5' 7\" (1.702 m)    Wt 171 lb (77.6 kg)    SpO2 100%    BMI 26.78 kg/m2       Physical Exam:    General:  Alert, well developed,well nourished AA male, cooperative, no distress, appears stated age.    Eyes: Conjunctivae/corneas clear. PERRL, EOMs intact. Fundi benign   Ears:  Normal TMs and external ear canals both ears. Nose: Nares normal. Septum midline. Mucosa normal. No drainage or sinus tenderness. Mouth/Throat: Lips, mucosa, and tongue normal. Teeth sparse, and gums normal.   Neck: Supple, symmetrical, trachea midline, no adenopathy, thyroid: no enlargement/tenderness/nodules, no carotid bruit and no JVD. Back:   Symmetric, no curvature. ROM normal. No CVA tenderness. Lungs:   Clear to auscultation bilaterally. Heart:  Regular rate and rhythm, S1, S2 normal, no murmur, click, rub or gallop. Abdomen:   Soft, non-tender. Bowel sounds normal. No masses,  No organomegaly. Extremities: Extremities normal, atraumatic, no cyanosis or edema. Pulses: 2+ and symmetric all extremities. Skin: Skin color, texture, turgor normal. No rashes or lesions   Lymph nodes: Cervical, supraclavicular, and axillary nodes normal.   Neurologic: CNII-XII intact. Normal strength, sensation and reflexes throughout.            Assessment/Plan     Alcohol Dependence  Depression  Suicidal ideation  Labs reviewed  Continue treatment per physician's orders Rifampin Counseling: I discussed with the patient the risks of rifampin including but not limited to liver damage, kidney damage, red-orange body fluids, nausea/vomiting and severe allergy.